# Patient Record
Sex: FEMALE | Race: BLACK OR AFRICAN AMERICAN | URBAN - METROPOLITAN AREA
[De-identification: names, ages, dates, MRNs, and addresses within clinical notes are randomized per-mention and may not be internally consistent; named-entity substitution may affect disease eponyms.]

---

## 2024-11-13 ENCOUNTER — OFFICE VISIT (OUTPATIENT)
Age: 64
End: 2024-11-13

## 2024-11-13 VITALS
HEART RATE: 91 BPM | TEMPERATURE: 98.4 F | BODY MASS INDEX: 25.99 KG/M2 | DIASTOLIC BLOOD PRESSURE: 78 MMHG | WEIGHT: 165.6 LBS | HEIGHT: 67 IN | SYSTOLIC BLOOD PRESSURE: 116 MMHG | OXYGEN SATURATION: 96 % | RESPIRATION RATE: 16 BRPM

## 2024-11-13 DIAGNOSIS — R11.2 NAUSEA AND VOMITING, UNSPECIFIED VOMITING TYPE: Primary | ICD-10-CM

## 2024-11-13 DIAGNOSIS — B34.9 VIRAL ILLNESS: ICD-10-CM

## 2024-11-13 LAB
Lab: NORMAL
PERFORMING INSTRUMENT: NORMAL
QC PASS/FAIL: NORMAL
SARS-COV-2, POC: NORMAL

## 2024-11-13 RX ORDER — LOSARTAN POTASSIUM 100 MG/1
100 TABLET ORAL DAILY
COMMUNITY
Start: 2024-09-10

## 2024-11-13 RX ORDER — FOLIC ACID 1 MG/1
1 TABLET ORAL DAILY
COMMUNITY
Start: 2024-06-26

## 2024-11-13 RX ORDER — PROCHLORPERAZINE MALEATE 10 MG
10 TABLET ORAL EVERY 6 HOURS PRN
COMMUNITY
Start: 2024-01-30

## 2024-11-13 RX ORDER — IBUPROFEN 600 MG/1
600 TABLET, FILM COATED ORAL EVERY 6 HOURS PRN
COMMUNITY
Start: 2024-10-21

## 2024-11-13 RX ORDER — POTASSIUM CHLORIDE 600 MG/1
8 TABLET, FILM COATED, EXTENDED RELEASE ORAL DAILY
COMMUNITY
Start: 2024-05-15

## 2024-11-13 RX ORDER — ACETAMINOPHEN 500 MG
1000 TABLET ORAL EVERY 6 HOURS PRN
COMMUNITY
Start: 2024-10-21 | End: 2024-11-20

## 2024-11-13 RX ORDER — FLUTICASONE PROPIONATE 50 MCG
1 SPRAY, SUSPENSION (ML) NASAL DAILY
COMMUNITY
Start: 2024-09-27

## 2024-11-13 RX ORDER — ONDANSETRON 8 MG/1
8 TABLET, FILM COATED ORAL EVERY 8 HOURS PRN
COMMUNITY
Start: 2024-11-12

## 2024-11-13 RX ORDER — METFORMIN HYDROCHLORIDE 500 MG/1
500 TABLET, EXTENDED RELEASE ORAL
COMMUNITY
Start: 2024-09-10

## 2024-11-13 RX ORDER — AMOXICILLIN 250 MG
1 CAPSULE ORAL 2 TIMES DAILY
COMMUNITY
Start: 2024-04-02

## 2024-11-13 RX ORDER — LANOLIN ALCOHOL/MO/W.PET/CERES
3 CREAM (GRAM) TOPICAL NIGHTLY
COMMUNITY
Start: 2024-04-02

## 2024-11-13 RX ORDER — AMLODIPINE BESYLATE 10 MG/1
10 TABLET ORAL DAILY
COMMUNITY
Start: 2024-09-10

## 2024-11-13 RX ORDER — FERROUS SULFATE 325(65) MG
1 TABLET ORAL DAILY
COMMUNITY
Start: 2024-10-19

## 2024-11-13 RX ORDER — ONDANSETRON 4 MG/1
4 TABLET, ORALLY DISINTEGRATING ORAL
Qty: 12 TABLET | Refills: 0 | Status: SHIPPED | OUTPATIENT
Start: 2024-11-13

## 2024-11-13 ASSESSMENT — ENCOUNTER SYMPTOMS
VOMITING: 1
ABDOMINAL PAIN: 1

## 2024-11-14 NOTE — PATIENT INSTRUCTIONS
Keep hydrated, tylenol or ibuprofen (if no contraindications) as needed if pain or fever..  follow up in  3- days if not better  Return sooner or go to the ER if symptoms worse/feeling worse or has new symptoms or concerns   Hydration-increasing amount of volume over time discussed and then progression of diet discussed  Go to ER if : spikes fever, if return of vomiting or N/V continues despite medication  . if abdominal pain becomes worse-constant and focalized to one specific area of abdomen or back, or if notices black or bloody stools, if dizzy/light headed, having heart/lung problems

## 2024-11-14 NOTE — PROGRESS NOTES
Tameka Cabrera (:  1960) is a 64 y.o. female,New patient, here for evaluation of the following chief complaint(s):  Vomiting (Vomiting/nausea with low grade fever- Started this afternoon ) and Abdominal Pain (X 2-3 days )      ASSESSMENT/PLAN:    ICD-10-CM    1. Nausea and vomiting, unspecified vomiting type  R11.2 POCT COVID-19, Antigen     ondansetron (ZOFRAN-ODT) 4 MG disintegrating tablet      2. Viral illness  B34.9 POCT COVID-19, Antigen        Results for orders placed or performed in visit on 24   POCT COVID-19, Antigen   Result Value Ref Range    SARS-COV-2, POC Not-Detected Not Detected    Lot Number [id number]     QC Pass/Fail PASS     Performing Instrument BinaxNOW      Exam  non focal   Suspect viral illness in view that  had same problem  Differential dx discussed  (less likely based on exam/symptoms) and not limited to   GB/liver disease, pancreatitis , bowel disease ,  hernia , chemotherapy , secondary to food product     Keep hydrated, tylenol or ibuprofen (if no contraindications) as needed if pain or fever..  follow up in  3- days if not better  Return sooner or go to the ER if symptoms worse/feeling worse or has new symptoms or concerns   Hydration-increasing amount of volume over time discussed and then progression of diet discussed  Go to ER if : spikes fever, if return of vomiting or N/V continues despite medication  . if abdominal pain becomes worse-constant and focalized to one specific area of abdomen or back, or if notices black or bloody stools, if dizzy/light headed, having heart/lung problems     SUBJECTIVE/OBJECTIVE:  Patient presents with:  Vomiting: Vomiting/nausea with low grade fever- Started this afternoon   Abdominal Pain: X 2-3 days    \ sick first  and getting better, she Monday night nausaea no vomiting or diarrhea low grade t 99  Tues vomited once, nauseated takes iron, no sore throat  No unusual ffods between both of them  No focal abdominal